# Patient Record
Sex: FEMALE | URBAN - METROPOLITAN AREA
[De-identification: names, ages, dates, MRNs, and addresses within clinical notes are randomized per-mention and may not be internally consistent; named-entity substitution may affect disease eponyms.]

---

## 2017-10-21 ENCOUNTER — VIRTUAL VISIT (OUTPATIENT)
Dept: FAMILY MEDICINE | Facility: OTHER | Age: 52
End: 2017-10-21

## 2017-10-21 NOTE — PROGRESS NOTES
"Date:   Clinician: Héctor Walsh  Clinician NPI: [id number]  Patient: Aleena Carranza  Patient : 1965  Patient Address: [address] [place] [address], [place] [address]  Patient Phone: [phone]  Visit Protocol: UTI  Patient Summary:  Aleena is a 52 year old ( : 1965 ) female who initiated a Visit for a presumed bladder infection. When asked the question \"Please sign me up to receive news, health information and promotions from [facility].\", Aleena responded \"No\".    Her symptoms began yesterday and consist of urgency, urinary incontinence, and urinary frequency.   Symptom Details   Urinary Frequency: Every 2-3 hours    She denies abdominal pain, vaginal discharge, flank pain, vomiting, hesitation, recent antibiotic use, dysuria, hematuria, feeling feverish, loss of appetite, chills, foul smelling urine, and nausea. Aleena has never had kidney stones. She has not been hospitalized, been a patient in a nursing home, or had a catheter in the past two weeks. She denies risk factors for sexually transmitted infections.   Aleena has not had a bladder infection in the past.   Aleena does not get yeast infections when she takes antibiotics.   She denies pregnancy and denies breastfeeding. She does not menstruate.   She does NOT smoke or use smokeless tobacco.   MEDICATIONS:   Patient free text response:  Hydrochlorothiazide  , ALLERGIES:  NKDA   Clinician Response:  Dear Aleena,  Based on the information you have provided, you likely have a bladder infection, also called an acute urinary tract infection (UTI).   To treat your infection, I am prescribing:   Bactrim DS. Swallow one (1) tablet twice a day for 3 days to treat your bladder infection. Continue taking the tablets even if you feel better before all the medication is gone. There is no refill with this prescription.   Some people develop allergies to antibiotics. If you notice a new rash, significant swelling, or difficulty " breathing, stop the medication immediately and go into a clinic for physical evaluation.   To help treat your current UTI and prevent future occurrences, remember to:     Drink 8-10, 8-ounce glasses of water daily.    Urinate after sexual intercourse.    Wipe front to back after using the bathroom.     Some women may develop a yeast infection as a side effect of taking antibiotics. If you notice symptoms of a yeast infection, [facility] can help treat that condition as well. Simply log in and complete another Visit, which will cover all of the necessary questions to determine the best treatment for you.   You should visit a clinic for a follow-up visit if your symptoms do not improve in 1-2 days or if you experience another urinary tract infection soon after completing this treatment.  If you become pregnant during this course of treatment, stop taking the medication and contact your primary care provider.   Diagnosis: Acute Uncomplicated Bladder Infection  Diagnosis ICD: N39.0  Prescription: sulfamethoxazole-TMP DS (Bactrim DS) 800-160mg oral tablet 6 tablets, 3 days supply. Take one tablet by mouth two times a day for 3 days. Refills: 0, Refill as needed: no, Allow substitutions: yes  Pharmacy: [id number] - [phone] - [address] [place], [place] [address] [address]